# Patient Record
Sex: OTHER/UNKNOWN | URBAN - METROPOLITAN AREA
[De-identification: names, ages, dates, MRNs, and addresses within clinical notes are randomized per-mention and may not be internally consistent; named-entity substitution may affect disease eponyms.]

---

## 2021-01-27 ENCOUNTER — APPOINTMENT (OUTPATIENT)
Age: 41
Setting detail: DERMATOLOGY
End: 2021-02-02

## 2021-01-27 VITALS
WEIGHT: 208 LBS | DIASTOLIC BLOOD PRESSURE: 73 MMHG | TEMPERATURE: 97.2 F | HEIGHT: 68 IN | SYSTOLIC BLOOD PRESSURE: 119 MMHG | HEART RATE: 69 BPM

## 2021-01-27 DIAGNOSIS — L91.8 OTHER HYPERTROPHIC DISORDERS OF THE SKIN: ICD-10-CM

## 2021-01-27 DIAGNOSIS — L82.1 OTHER SEBORRHEIC KERATOSIS: ICD-10-CM

## 2021-01-27 PROCEDURE — OTHER COUNSELING: OTHER

## 2021-01-27 PROCEDURE — 99202 OFFICE O/P NEW SF 15 MIN: CPT

## 2021-01-27 PROCEDURE — OTHER MIPS QUALITY: OTHER

## 2021-01-27 ASSESSMENT — LOCATION DETAILED DESCRIPTION DERM
LOCATION DETAILED: LEFT INFRAMAMMARY CREASE (INNER QUADRANT)
LOCATION DETAILED: RIGHT INFERIOR ANTERIOR NECK
LOCATION DETAILED: RIGHT LATERAL ABDOMEN
LOCATION DETAILED: LEFT MEDIAL BREAST 7-8:00 REGION
LOCATION DETAILED: SUBXIPHOID
LOCATION DETAILED: RIGHT RIB CAGE
LOCATION DETAILED: LEFT ANTERIOR AXILLA
LOCATION DETAILED: LEFT INFERIOR ANTERIOR NECK
LOCATION DETAILED: LEFT RIB CAGE

## 2021-01-27 ASSESSMENT — LOCATION SIMPLE DESCRIPTION DERM
LOCATION SIMPLE: LEFT BREAST
LOCATION SIMPLE: RIGHT ANTERIOR NECK
LOCATION SIMPLE: LEFT ANTERIOR NECK
LOCATION SIMPLE: LEFT AXILLA
LOCATION SIMPLE: ABDOMEN

## 2021-01-27 ASSESSMENT — LOCATION ZONE DERM
LOCATION ZONE: AXILLAE
LOCATION ZONE: NECK
LOCATION ZONE: TRUNK

## 2021-01-27 NOTE — HPI: SKIN LESION
What Type Of Note Output Would You Prefer (Optional)?: Bullet Format
How Severe Is Your Skin Lesion?: moderate
Has Your Skin Lesion Been Treated?: not been treated
Is This A New Presentation, Or A Follow-Up?: Growths
Is This A New Presentation, Or A Follow-Up?: Skin Lesion

## 2021-05-13 ENCOUNTER — APPOINTMENT (OUTPATIENT)
Age: 41
Setting detail: DERMATOLOGY
End: 2021-05-14

## 2021-05-13 VITALS — WEIGHT: 208 LBS | HEIGHT: 68 IN

## 2021-05-13 DIAGNOSIS — L91.8 OTHER HYPERTROPHIC DISORDERS OF THE SKIN: ICD-10-CM

## 2021-05-13 PROCEDURE — OTHER MIPS QUALITY: OTHER

## 2021-05-13 PROCEDURE — OTHER SKIN TAG REMOVAL (COSMETIC): OTHER

## 2021-05-13 PROCEDURE — OTHER COUNSELING: OTHER

## 2021-05-13 ASSESSMENT — LOCATION ZONE DERM: LOCATION ZONE: NECK

## 2021-05-13 ASSESSMENT — LOCATION DETAILED DESCRIPTION DERM
LOCATION DETAILED: RIGHT INFERIOR LATERAL NECK
LOCATION DETAILED: LEFT INFERIOR LATERAL NECK
LOCATION DETAILED: LEFT INFERIOR ANTERIOR NECK
LOCATION DETAILED: LEFT SUPERIOR LATERAL NECK

## 2021-05-13 ASSESSMENT — LOCATION SIMPLE DESCRIPTION DERM
LOCATION SIMPLE: RIGHT ANTERIOR NECK
LOCATION SIMPLE: LEFT ANTERIOR NECK

## 2021-05-13 NOTE — PROCEDURE: SKIN TAG REMOVAL (COSMETIC)
Detail Level: Detailed
Anesthesia Type: 1% lidocaine with epinephrine
Anesthesia Volume In Cc: 3
Price (Use Numbers Only, No Special Characters Or $): 150
Removed With: gradle excision
Hemostasis: Drysol
Consent: Written consent obtained and the risks of skin tag removal was reviewed with the patient including but not limited to bleeding, pigmentary change, infection, pain, and remote possibility of scarring.